# Patient Record
Sex: FEMALE
[De-identification: names, ages, dates, MRNs, and addresses within clinical notes are randomized per-mention and may not be internally consistent; named-entity substitution may affect disease eponyms.]

---

## 2020-02-18 ENCOUNTER — HOSPITAL ENCOUNTER (EMERGENCY)
Dept: HOSPITAL 97 - ER | Age: 45
Discharge: HOME | End: 2020-02-18
Payer: COMMERCIAL

## 2020-02-18 VITALS — OXYGEN SATURATION: 96 % | SYSTOLIC BLOOD PRESSURE: 135 MMHG | DIASTOLIC BLOOD PRESSURE: 80 MMHG | TEMPERATURE: 98.1 F

## 2020-02-18 DIAGNOSIS — F41.9: ICD-10-CM

## 2020-02-18 DIAGNOSIS — J06.9: Primary | ICD-10-CM

## 2020-02-18 DIAGNOSIS — A08.39: ICD-10-CM

## 2020-02-18 PROCEDURE — 87070 CULTURE OTHR SPECIMN AEROBIC: CPT

## 2020-02-18 PROCEDURE — 87804 INFLUENZA ASSAY W/OPTIC: CPT

## 2020-02-18 PROCEDURE — 99283 EMERGENCY DEPT VISIT LOW MDM: CPT

## 2020-02-18 PROCEDURE — 87081 CULTURE SCREEN ONLY: CPT

## 2020-02-18 NOTE — EDPHYS
Physician Documentation                                                                           

 [facility]                                                                 

Name: Maura Ames                                                                             

Age: 44 yrs                                                                                       

Sex: Female                                                                                       

: 1975                                                                                   

MRN: [id number]                                                                                   

Arrival Date: 2020                                                                          

Time: 12:32                                                                                       

Account#: [id number]                                                                            

Bed 17                                                                                            

Private MD: Fransisco Cooley ED Physician Tiago Penn                                                                     

HPI:                                                                                              

                                                                                             

14:01 This 44 yrs old  Female presents to ER via Ambulatory with complaints of Cough, tw4 

      Diarrhea, Dizziness.                                                                        

14:01 The patient or guardian reports cough, that is constant. Onset: The symptoms/episode    tw4 

      began/occurred today. Severity of symptoms: At their worst the symptoms were moderate,      

      in the emergency department the symptoms are unchanged. Modifying factors: The symptoms     

      are alleviated by nothing, the symptoms are aggravated by nothing. The patient has not      

      experienced similar symptoms in the past.                                                   

                                                                                                  

OB/GYN:                                                                                           

13:03 LMP N/A - Hysterectomy                                                                  ca1 

                                                                                                  

Historical:                                                                                       

- Allergies:                                                                                      

13:03 No Known Allergies;                                                                     ca1 

- Home Meds:                                                                                      

13:03 Adderall XR 20 mg Oral cp24 1 cap once daily [Active]; alprazolam 2 mg Oral tab nightly ca1 

      [Active];                                                                                   

- PMHx:                                                                                           

13:03 Anxiety;                                                                                ca1 

- PSHx:                                                                                           

13:03 cyst removal from ovary; Hysterectomy;                                                  ca1 

                                                                                                  

- Immunization history:: Adult Immunizations up to date, Flu vaccine is not up to date.           

- Coronavirus screen:: The patient has NOT traveled to [place] in the past 14 days. The             

  patient has NOT had contact with known/suspected case of Coronavirus?.                          

- Social history:: Smoking status: Patient denies any tobacco usage or history of.                

- Ebola Screening: : Patient negative for fever greater than or equal to 101.5 degrees            

  Fahrenheit, and additional compatible Ebola Virus Disease symptoms Patient denies               

  exposure to infectious person Patient denies travel to an Ebola-affected area in the            

  21 days before illness onset No symptoms or risks identified at this time.                      

                                                                                                  

                                                                                                  

ROS:                                                                                              

14:01 Constitutional: Negative for fever, chills, and weight loss, Eyes: Negative for injury, tw4 

      pain, redness, and discharge, Cardiovascular: Negative for chest pain, palpitations,        

      and edema, Abdomen/GI: Negative for abdominal pain, nausea, vomiting, diarrhea, and         

      constipation, Back: Negative for injury and pain, MS/Extremity: Negative for injury and     

      deformity, Skin: Negative for injury, rash, and discoloration.                              

14:01 Respiratory: Positive for cough, Negative for dyspnea on exertion, hemoptysis,              

      orthopnea, pleurisy, shortness of breath, sputum production.                                

                                                                                                  

Exam:                                                                                             

14:01 Constitutional:  This is a well developed, well nourished patient who is awake, alert,  tw4 

      and in no acute distress. Head/Face:  Normocephalic, atraumatic. Chest/axilla:  Normal      

      chest wall appearance and motion.  Nontender with no deformity.  No lesions are             

      appreciated. Cardiovascular:  Regular rate and rhythm with a normal S1 and S2.  No          

      gallops, murmurs, or rubs.  Normal PMI, no JVD.  No pulse deficits. Respiratory:  Lungs     

      have equal breath sounds bilaterally, clear to auscultation and percussion.  No rales,      

      rhonchi or wheezes noted.  No increased work of breathing, no retractions or nasal          

      flaring. Abdomen/GI:  Soft, non-tender, with normal bowel sounds.  No distension or         

      tympany.  No guarding or rebound.  No evidence of tenderness throughout. Skin:  Warm,       

      dry with normal turgor.  Normal color with no rashes, no lesions, and no evidence of        

      cellulitis. MS/ Extremity:  Pulses equal, no cyanosis.  Neurovascular intact.  Full,        

      normal range of motion. Neuro:  Awake and alert, GCS 15, oriented to person, place,         

      time, and situation.  Cranial nerves II-XII grossly intact.  Motor strength 5/5 in all      

      extremities.  Sensory grossly intact.  Cerebellar exam normal.  Normal gait.                

                                                                                                  

Vital Signs:                                                                                      

13:03  / 80; Pulse 83; Resp 16 S; Temp 98.1(O); Pulse Ox 96% on R/A; Weight 122.47 kg   ca1 

      (R); Height 5 ft. 4 in. (162.56 cm) (R); Pain 6/10;                                         

14:00  / 82; Pulse 81; Resp 17; Pulse Ox 99% on R/A;                                    rb1 

15:00  / 75; Pulse 79; Resp 18; Pulse Ox 100% on R/A; Pain 0/10;                        rb1 

13:03 Body Mass Index 46.34 (122.47 kg, 162.56 cm)                                            ca1 

                                                                                                  

MDM:                                                                                              

13:11 Patient medically screened.                                                             tw4 

20:09 Differential Diagnosis: Obstructed Airway Bronchitis Influenza Allergic Rhinitis. Data  tw4 

      reviewed: vital signs, nurses notes. Data reviewed: lab test result(s), Flu: negative.      

      Data interpreted: Pulse oximetry: Interpretation: normal. Counseling: I had a detailed      

      discussion with the patient and/or guardian regarding: the historical points, exam          

      findings, and any diagnostic results supporting the discharge/admit diagnosis. Special      

      discussion: I discussed with the patient/guardian in detail that at this point there is     

      no indication for admission to the hospital. It is understood, however, that if the         

      symptoms persist or worsen the patient needs to return immediately for re-evaluation.       

                                                                                                  

                                                                                             

13:21 Order name: Flu                                                                         tw4 

                                                                                             

13:21 Order name: Strep                                                                       tw4 

                                                                                             

13:54 Order name: Throat Culture                                                              EDMS

                                                                                                  

Administered Medications:                                                                         

No medications were administered                                                                  

                                                                                                  

                                                                                                  

Disposition:                                                                                      

20 14:51 Discharged to Home. Impression: Acute upper respiratory infection, unspecified,    

  Other viral enteritis.                                                                          

- Condition is Stable.                                                                            

- Discharge Instructions: Food Choices to Help Relieve Diarrhea, Adult, Upper                     

  Respiratory Infection, Pediatric.                                                               

- Prescriptions for Tessalon Perles 100 mg Oral Capsule - take 1 capsule by ORAL route            

  every 8 hours As needed; 15 capsule. Lomotil 2.5- 0.025 mg Oral Tablet - take 2                 

  tablet by ORAL route once daily As needed; 20 tablet. Guaifenesin AC 10- 100 mg/5 mL            

  Oral Liquid - take 10 milliliter by ORAL route every 4 hours As needed; 240                     

  milliliter.                                                                                     

- Medication Reconciliation Form, Thank You Letter, Antibiotic Education, Prescription            

  Opioid Use form.                                                                                

- Follow up: Fransisco Cooley MD; When: Upon discharge from the Emergency Department;               

  Reason: If symptoms return, Recheck today's complaints, Continuance of care,                    

  Re-evaluation by your physician.                                                                

- Problem is new.                                                                                 

- Symptoms have improved.                                                                         

                                                                                                  

                                                                                                  

                                                                                                  

Signatures:                                                                                       

Dispatcher [employer]                           EDMS                                                 

Ashli Mccloud RN                     RN   rb1                                                  

Tiago Penn MD MD   tw4                                                  

Jovita Duncan RN                        RN   ca1                                                  

                                                                                                  

Corrections: (The following items were deleted from the chart)                                    

14:52 14:51 2020 14:51 Discharged to Home. Impression: Acute upper respiratory          tw4 

      infection, unspecified. Condition is Stable. Forms are Medication Reconciliation Form,      

      Thank You Letter, Antibiotic Education, Prescription Opioid Use. Follow up: Fransisco Cooley; When: Upon discharge from the Emergency Department; Reason: If symptoms return,     

      Recheck today's complaints, Continuance of care, Re-evaluation by your physician.           

      Problem is new. Symptoms have improved. tw4                                                 

15:08 14:52 2020 14:51 Discharged to Home. Impression: Acute upper respiratory          rb1 

      infection, unspecified; Other viral enteritis. Condition is Stable. Forms are               

      Medication Reconciliation Form, Thank You Letter, Antibiotic Education, Prescription        

      Opioid Use. Follow up: Fransisco Cooley; When: Upon discharge from the Emergency                

      Department; Reason: If symptoms return, Recheck today's complaints, Continuance of          

      care, Re-evaluation by your physician. Problem is new. Symptoms have improved. tw4          

                                                                                                  

************************************************************************************************** No change

## 2020-02-18 NOTE — ER
Nurse's Notes                                                                                     

 [facility]                                                                 

Name: Maura Ames                                                                             

Age: 44 yrs                                                                                       

Sex: Female                                                                                       

: 1975                                                                                   

MRN: [id number]                                                                                   

Arrival Date: 2020                                                                          

Time: 12:32                                                                                       

Account#: [id number]                                                                            

Bed 17                                                                                            

Private MD: Fransisco Cooley                                                                         

Diagnosis: Acute upper respiratory infection, unspecified;Other viral enteritis                   

                                                                                                  

Presentation:                                                                                     

                                                                                             

13:01 Presenting complaint: Patient states: Cough and congestion x 1 week, Diarrhea and       ca1 

      headaches x 4 days, fever and chills on and off few days ago. Transition of care:           

      patient was not received from another setting of care. Onset of symptoms was 2020. Risk Assessment: Do you want to hurt yourself or someone else? Patient            

      reports no desire to harm self or others. Initial Sepsis Screen: Does the patient meet      

      any 2 criteria? No. Patient's initial sepsis screen is negative. Does the patient have      

      a suspected source of infection? No. Patient's initial sepsis screen is negative. Care      

      prior to arrival: None.                                                                     

13:01 Method Of Arrival: Ambulatory                                                           ca1 

13:01 Acuity: LUDA 4                                                                           ca1 

                                                                                                  

OB/GYN:                                                                                           

13:03 LMP N/A - Hysterectomy                                                                  ca1 

                                                                                                  

Historical:                                                                                       

- Allergies:                                                                                      

13:03 No Known Allergies;                                                                     ca1 

- Home Meds:                                                                                      

13:03 Adderall XR 20 mg Oral cp24 1 cap once daily [Active]; alprazolam 2 mg Oral tab nightly ca1 

      [Active];                                                                                   

- PMHx:                                                                                           

13:03 Anxiety;                                                                                ca1 

- PSHx:                                                                                           

13:03 cyst removal from ovary; Hysterectomy;                                                  ca1 

                                                                                                  

- Immunization history:: Adult Immunizations up to date, Flu vaccine is not up to date.           

- Coronavirus screen:: The patient has NOT traveled to [place] in the past 14 days. The             

  patient has NOT had contact with known/suspected case of Coronavirus?.                          

- Social history:: Smoking status: Patient denies any tobacco usage or history of.                

- Ebola Screening: : Patient negative for fever greater than or equal to 101.5 degrees            

  Fahrenheit, and additional compatible Ebola Virus Disease symptoms Patient denies               

  exposure to infectious person Patient denies travel to an Ebola-affected area in the            

  21 days before illness onset No symptoms or risks identified at this time.                      

                                                                                                  

                                                                                                  

Screenin:09 Abuse screen: Denies threats or abuse. Nutritional screening: No deficits noted.        rb1 

      Tuberculosis screening: No symptoms or risk factors identified. Fall Risk None              

      identified.                                                                                 

                                                                                                  

Assessment:                                                                                       

13:09 General: Appears in no apparent distress. comfortable, Behavior is calm, cooperative,   rb1 

      Reports fever for. Pain: Complains of pain in head Pain currently is 8 out of 10 on a       

      pain scale. Pain began x 4 days. Neuro: Level of Consciousness is awake, alert, obeys       

      commands, Oriented to person, place, time, situation. Neuro: Reports headache frontal       

      area. Cardiovascular: Capillary refill < 3 seconds is brisk in bilateral fingers.           

      Respiratory: Reports cough that is productive, Airway is patent Respiratory effort is       

      even, unlabored, Respiratory pattern is regular, symmetrical. GI: Reports diarrhea. :     

      No signs and/or symptoms were reported regarding the genitourinary system. Derm: Skin       

      is pink, warm \T\ dry.                                                                      

14:00 Reassessment: Patient appears in no apparent distress at this time. No changes from     rb1 

      previously documented assessment.                                                           

15:00 Reassessment: Patient appears in no apparent distress at this time. Patient and/or      rb1 

      family updated on plan of care and expected duration. Pain level reassessed. Patient is     

      alert, oriented x 3, equal unlabored respirations, skin warm/dry/pink. Patient denies       

      pain at this time.                                                                          

                                                                                                  

Vital Signs:                                                                                      

13:03  / 80; Pulse 83; Resp 16 S; Temp 98.1(O); Pulse Ox 96% on R/A; Weight 122.47 kg   ca1 

      (R); Height 5 ft. 4 in. (162.56 cm) (R); Pain 6/10;                                         

14:00  / 82; Pulse 81; Resp 17; Pulse Ox 99% on R/A;                                    rb1 

15:00  / 75; Pulse 79; Resp 18; Pulse Ox 100% on R/A; Pain 0/10;                        rb1 

13:03 Body Mass Index 46.34 (122.47 kg, 162.56 cm)                                            ca1 

                                                                                                  

ED Course:                                                                                        

12:32 Patient arrived in ED.                                                                  rg4 

12:32 Fransisco Cooley MD is Private Physician.                                                 rg4 

13:02 Triage completed.                                                                       ca1 

13:03 Arm band placed on right wrist.                                                         ca1 

13:09 Patient has correct armband on for positive identification. Bed in low position. Call   rb1 

      light in reach. Side rails up X 1. Pulse ox on. NIBP on.                                    

13:11 Tiago Penn MD is Attending Physician.                                            tw4 

13:14 Ashli Mccloud, RN is Primary Nurse.                                                   rb1 

13:29 Flu Sent.                                                                               rb1 

13:29 Strep Sent.                                                                             rb1 

14:50 Fransisco Cooley MD is Referral Physician.                                                tw4 

15:08 No provider procedures requiring assistance completed. Patient did not have IV access   rb1 

      during this emergency room visit.                                                           

                                                                                                  

Administered Medications:                                                                         

No medications were administered                                                                  

                                                                                                  

                                                                                                  

Outcome:                                                                                          

14:51 Discharge ordered by MD.                                                                tw4 

15:08 Patient left the ED.                                                                    rb1 

15:08 Discharged to home ambulatory, with family.                                             rb1 

15:08 Condition: stable                                                                           

15:08 Discharge instructions given to patient, Instructed on discharge instructions, follow       

      up and referral plans. medication usage, Demonstrated understanding of instructions,        

      follow-up care, medications, Prescriptions given X 3.                                       

                                                                                                  

Signatures:                                                                                       

Ashli Mccloud, RN                     RN   rb1                                                  

Zunilda Arceo                                 rg4                                                  

Tiago Penn MD MD   tw4                                                  

Jovita Duncan RN                        RN   ca1                                                  

                                                                                                  

**************************************************************************************************

## 2022-12-26 ENCOUNTER — HOSPITAL ENCOUNTER (EMERGENCY)
Dept: HOSPITAL 97 - ER | Age: 47
Discharge: HOME | End: 2022-12-26
Payer: COMMERCIAL

## 2022-12-26 VITALS — OXYGEN SATURATION: 100 % | SYSTOLIC BLOOD PRESSURE: 138 MMHG | DIASTOLIC BLOOD PRESSURE: 99 MMHG

## 2022-12-26 VITALS — TEMPERATURE: 98.1 F

## 2022-12-26 DIAGNOSIS — S29.012A: Primary | ICD-10-CM

## 2022-12-26 DIAGNOSIS — S39.012A: ICD-10-CM

## 2022-12-26 PROCEDURE — 70450 CT HEAD/BRAIN W/O DYE: CPT

## 2022-12-26 PROCEDURE — 99284 EMERGENCY DEPT VISIT MOD MDM: CPT

## 2022-12-26 PROCEDURE — 72100 X-RAY EXAM L-S SPINE 2/3 VWS: CPT

## 2022-12-26 PROCEDURE — 96372 THER/PROPH/DIAG INJ SC/IM: CPT

## 2022-12-26 PROCEDURE — 72125 CT NECK SPINE W/O DYE: CPT
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
